# Patient Record
Sex: FEMALE | Race: WHITE | URBAN - METROPOLITAN AREA
[De-identification: names, ages, dates, MRNs, and addresses within clinical notes are randomized per-mention and may not be internally consistent; named-entity substitution may affect disease eponyms.]

---

## 2023-03-07 DIAGNOSIS — E63.9 NUTRITIONAL DEFICIENCY: ICD-10-CM

## 2023-03-07 DIAGNOSIS — F41.9 ANXIETY: Primary | ICD-10-CM

## 2023-03-07 DIAGNOSIS — N28.9 KIDNEY FUNCTION ABNORMAL: ICD-10-CM

## 2023-03-07 DIAGNOSIS — N93.8 DUB (DYSFUNCTIONAL UTERINE BLEEDING): ICD-10-CM

## 2023-03-07 DIAGNOSIS — L68.0 HIRSUTISM: ICD-10-CM

## 2023-03-07 DIAGNOSIS — R79.89 LOW VITAMIN D LEVEL: ICD-10-CM

## 2023-03-08 LAB
ALANINE AMINOTRANSFERASE (SGPT) (U/L) IN SER/PLAS: 60 U/L (ref 7–45)
ALBUMIN (G/DL) IN SER/PLAS: 4.5 G/DL (ref 3.4–5)
ALKALINE PHOSPHATASE (U/L) IN SER/PLAS: 57 U/L (ref 33–110)
ANION GAP IN SER/PLAS: 13 MMOL/L (ref 10–20)
ASPARTATE AMINOTRANSFERASE (SGOT) (U/L) IN SER/PLAS: 25 U/L (ref 9–39)
BASOPHILS (10*3/UL) IN BLOOD BY AUTOMATED COUNT: 0.04 X10E9/L (ref 0–0.1)
BASOPHILS/100 LEUKOCYTES IN BLOOD BY AUTOMATED COUNT: 0.7 % (ref 0–2)
BILIRUBIN TOTAL (MG/DL) IN SER/PLAS: 0.5 MG/DL (ref 0–1.2)
CALCIDIOL (25 OH VITAMIN D3) (NG/ML) IN SER/PLAS: 50 NG/ML
CALCIUM (MG/DL) IN SER/PLAS: 9.6 MG/DL (ref 8.6–10.6)
CARBON DIOXIDE, TOTAL (MMOL/L) IN SER/PLAS: 27 MMOL/L (ref 21–32)
CHLORIDE (MMOL/L) IN SER/PLAS: 104 MMOL/L (ref 98–107)
CHOLESTEROL (MG/DL) IN SER/PLAS: 151 MG/DL (ref 0–199)
CHOLESTEROL IN HDL (MG/DL) IN SER/PLAS: 39.3 MG/DL
CHOLESTEROL/HDL RATIO: 3.8
CREATININE (MG/DL) IN SER/PLAS: 0.97 MG/DL (ref 0.5–1.05)
EOSINOPHILS (10*3/UL) IN BLOOD BY AUTOMATED COUNT: 0.13 X10E9/L (ref 0–0.7)
EOSINOPHILS/100 LEUKOCYTES IN BLOOD BY AUTOMATED COUNT: 2.4 % (ref 0–6)
ERYTHROCYTE DISTRIBUTION WIDTH (RATIO) BY AUTOMATED COUNT: 12 % (ref 11.5–14.5)
ERYTHROCYTE MEAN CORPUSCULAR HEMOGLOBIN CONCENTRATION (G/DL) BY AUTOMATED: 33.3 G/DL (ref 32–36)
ERYTHROCYTE MEAN CORPUSCULAR VOLUME (FL) BY AUTOMATED COUNT: 97 FL (ref 80–100)
ERYTHROCYTES (10*6/UL) IN BLOOD BY AUTOMATED COUNT: 4.36 X10E12/L (ref 4–5.2)
FOLLITROPIN (IU/L) IN SER/PLAS: 8.4 IU/L
GFR FEMALE: 77 ML/MIN/1.73M2
GLUCOSE (MG/DL) IN SER/PLAS: 89 MG/DL (ref 74–99)
HEMATOCRIT (%) IN BLOOD BY AUTOMATED COUNT: 42.3 % (ref 36–46)
HEMOGLOBIN (G/DL) IN BLOOD: 14.1 G/DL (ref 12–16)
IMMATURE GRANULOCYTES/100 LEUKOCYTES IN BLOOD BY AUTOMATED COUNT: 0.2 % (ref 0–0.9)
LDL: 93 MG/DL (ref 0–99)
LEUKOCYTES (10*3/UL) IN BLOOD BY AUTOMATED COUNT: 5.5 X10E9/L (ref 4.4–11.3)
LUTEINIZING HORMONE (IU/ML) IN SER/PLAS: 4.1 IU/L
LYMPHOCYTES (10*3/UL) IN BLOOD BY AUTOMATED COUNT: 1.94 X10E9/L (ref 1.2–4.8)
LYMPHOCYTES/100 LEUKOCYTES IN BLOOD BY AUTOMATED COUNT: 35.3 % (ref 13–44)
MONOCYTES (10*3/UL) IN BLOOD BY AUTOMATED COUNT: 0.33 X10E9/L (ref 0.1–1)
MONOCYTES/100 LEUKOCYTES IN BLOOD BY AUTOMATED COUNT: 6 % (ref 2–10)
NEUTROPHILS (10*3/UL) IN BLOOD BY AUTOMATED COUNT: 3.04 X10E9/L (ref 1.2–7.7)
NEUTROPHILS/100 LEUKOCYTES IN BLOOD BY AUTOMATED COUNT: 55.4 % (ref 40–80)
NRBC (PER 100 WBCS) BY AUTOMATED COUNT: 0 /100 WBC (ref 0–0)
PLATELETS (10*3/UL) IN BLOOD AUTOMATED COUNT: 297 X10E9/L (ref 150–450)
POTASSIUM (MMOL/L) IN SER/PLAS: 4.4 MMOL/L (ref 3.5–5.3)
PROLACTIN (UG/L) IN SER/PLAS: 14.5 UG/L (ref 3–20)
PROTEIN TOTAL: 7.2 G/DL (ref 6.4–8.2)
SODIUM (MMOL/L) IN SER/PLAS: 140 MMOL/L (ref 136–145)
TESTOSTERONE (NG/DL) IN SER/PLAS: <60 NG/DL (ref 0–70)
THYROTROPIN (MIU/L) IN SER/PLAS BY DETECTION LIMIT <= 0.05 MIU/L: 2.76 MIU/L (ref 0.44–3.98)
TRIGLYCERIDE (MG/DL) IN SER/PLAS: 94 MG/DL (ref 0–149)
UREA NITROGEN (MG/DL) IN SER/PLAS: 25 MG/DL (ref 6–23)
VLDL: 19 MG/DL (ref 0–40)

## 2023-03-09 ENCOUNTER — TELEPHONE (OUTPATIENT)
Dept: PRIMARY CARE | Facility: CLINIC | Age: 38
End: 2023-03-09

## 2023-03-09 DIAGNOSIS — R74.8 ELEVATED LIVER ENZYMES: Primary | ICD-10-CM

## 2023-03-09 NOTE — TELEPHONE ENCOUNTER
----- Message from Kinza Rayo MD sent at 3/9/2023 12:04 PM EST -----  Lab results are normal normal female hormone

## 2023-03-09 NOTE — TELEPHONE ENCOUNTER
----- Message from Kinza Rayo MD sent at 3/9/2023 12:04 PM EST -----  Lab results are normal thyroid normal

## 2023-03-09 NOTE — TELEPHONE ENCOUNTER
Result Communication    Resulted Orders   FSH & LH   Result Value Ref Range    Follicle Stimulating Hormone 8.4 IU/L      Comment:      REF VALUES  FOLLICULAR  2-12  MID-CYCLE     12-25  LUTEAL PHASE   2-12  MENOPAUSE       PREPUBERTY    50% ADULT  ADULT MALE     2-10  INFANTS        0-1      LUTROPIN (IU/ML) IN SER/PLAS 4.1 IU/L      Comment:      REF VALUES  FOLLICULAR PHASE 1.9-12.5  MID-CYCLE        8.7-76.3  LUTEAL PHASE     0.5-16.9  POST MENOPAUSE   5.0-55.2  CHILDREN           0- 6.0  ADULT MALE 18-70 1.5- 9.3  ADULT MALE >70   3.1-34.6         12:41 PM      Results were successfully communicated with the patient and they acknowledged their understanding.

## 2023-03-09 NOTE — TELEPHONE ENCOUNTER
----- Message from Kinza Rayo MD sent at 3/9/2023 12:03 PM EST -----  Lab results are normal vitamin D level is normal

## 2023-03-09 NOTE — TELEPHONE ENCOUNTER
----- Message from Kinza Rayo MD sent at 3/9/2023 12:06 PM EST -----  Please notify pt of lab results 1 slightly elevated liver enzymes I do not think this is significant but I do want a repeat this blood work in about 2 weeks, order inputted

## 2023-03-09 NOTE — TELEPHONE ENCOUNTER
Result Communication    Resulted Orders   Testosterone   Result Value Ref Range    Testosterone <60 0 - 70 ng/dL      Comment:       Nandrolone decanoate, 11 Beta-hydroxytestosterone, androstenedione,   testosterone propionate and 11-keto-testosterone strongly cross    react with this test method.    Biotin interference may cause falsely elevated results. Patients   taking a Biotin dose of up to 5 mg/day should refrain from taking   Biotin for 24 hours before sample collection. Providers may contact   their local laboratory for further information.   Testing by a more sensitive method (Testosterone Total LC-MS/MS)   is recommended for accurate quantification of testosterone    levels less than 60 ng/dL.         12:43 PM      Results were successfully communicated with the patient and they acknowledged their understanding.

## 2023-03-09 NOTE — TELEPHONE ENCOUNTER
Result Communication    Resulted Orders   CBC and Auto Differential   Result Value Ref Range    WBC 5.5 4.4 - 11.3 x10E9/L    nRBC 0.0 0.0 - 0.0 /100 WBC    RBC 4.36 4.00 - 5.20 x10E12/L    Hemoglobin 14.1 12.0 - 16.0 g/dL    Hematocrit 42.3 36.0 - 46.0 %    MCV 97 80 - 100 fL    MCHC 33.3 32.0 - 36.0 g/dL    Platelets 297 150 - 450 x10E9/L    RDW 12.0 11.5 - 14.5 %    Neutrophils % 55.4 40.0 - 80.0 %    Immature Granulocytes %, Automated 0.2 0.0 - 0.9 %      Comment:       Immature Granulocyte Count (IG) includes promyelocytes,    myelocytes and metamyelocytes but does not include bands.   Percent differential counts (%) should be interpreted in the   context of the absolute cell counts (cells/L).      Lymphocytes % 35.3 13.0 - 44.0 %    Monocytes % 6.0 2.0 - 10.0 %    Eosinophils % 2.4 0.0 - 6.0 %    Basophils % 0.7 0.0 - 2.0 %    Neutrophils Absolute 3.04 1.20 - 7.70 x10E9/L    Lymphocytes Absolute 1.94 1.20 - 4.80 x10E9/L    Monocytes Absolute 0.33 0.10 - 1.00 x10E9/L    Eosinophils Absolute 0.13 0.00 - 0.70 x10E9/L    Basophils Absolute 0.04 0.00 - 0.10 x10E9/L       12:36 PM      Results were successfully communicated with the patient and they acknowledged their understanding.  Result Communication    Resulted Orders   CBC and Auto Differential   Result Value Ref Range    WBC 5.5 4.4 - 11.3 x10E9/L    nRBC 0.0 0.0 - 0.0 /100 WBC    RBC 4.36 4.00 - 5.20 x10E12/L    Hemoglobin 14.1 12.0 - 16.0 g/dL    Hematocrit 42.3 36.0 - 46.0 %    MCV 97 80 - 100 fL    MCHC 33.3 32.0 - 36.0 g/dL    Platelets 297 150 - 450 x10E9/L    RDW 12.0 11.5 - 14.5 %    Neutrophils % 55.4 40.0 - 80.0 %    Immature Granulocytes %, Automated 0.2 0.0 - 0.9 %      Comment:       Immature Granulocyte Count (IG) includes promyelocytes,    myelocytes and metamyelocytes but does not include bands.   Percent differential counts (%) should be interpreted in the   context of the absolute cell counts (cells/L).      Lymphocytes % 35.3 13.0 - 44.0 %     Monocytes % 6.0 2.0 - 10.0 %    Eosinophils % 2.4 0.0 - 6.0 %    Basophils % 0.7 0.0 - 2.0 %    Neutrophils Absolute 3.04 1.20 - 7.70 x10E9/L    Lymphocytes Absolute 1.94 1.20 - 4.80 x10E9/L    Monocytes Absolute 0.33 0.10 - 1.00 x10E9/L    Eosinophils Absolute 0.13 0.00 - 0.70 x10E9/L    Basophils Absolute 0.04 0.00 - 0.10 x10E9/L       12:36 PM      Results were successfully communicated with the patient and they acknowledged their understanding.

## 2023-03-09 NOTE — RESULT ENCOUNTER NOTE
Please notify pt of lab results 1 slightly elevated liver enzymes I do not think this is significant but I do want a repeat this blood work in about 2 weeks, order inputted

## 2023-03-09 NOTE — TELEPHONE ENCOUNTER
Result Communication    Resulted Orders   Prolactin   Result Value Ref Range    Prolactin 14.5 3.0 - 20.0 ug/L       12:44 PM      Results were successfully communicated with the patient and they acknowledged their understanding.

## 2023-03-09 NOTE — TELEPHONE ENCOUNTER
Result Communication    Resulted Orders   Thyroid Stimulating Hormone   Result Value Ref Range    TSH 2.76 0.44 - 3.98 mIU/L      Comment:       TSH testing is performed using different testing    methodology at [facility] than at other    [facility] hospitals. Direct result comparisons should    only be made within the same method.         12:44 PM      Results were successfully communicated with the patient and they acknowledged their understanding.

## 2023-03-09 NOTE — TELEPHONE ENCOUNTER
Result Communication    Resulted Orders   Vitamin D, Total   Result Value Ref Range    Vitamin D, 25-Hydroxy 50 ng/mL      Comment:      .  DEFICIENCY:         < 20   NG/ML  INSUFFICIENCY:      20-29  NG/ML  SUFFICIENCY:         NG/ML    THIS ASSAY ACCURATELY QUANTIFIES THE SUM OF  VITAMIN D3, 25-HYDROXY AND VIT D2,25-HYDROXY.         12:42 PM      Results were successfully communicated with the patient and they acknowledged their understanding.

## 2023-03-09 NOTE — TELEPHONE ENCOUNTER
Result Communication    Resulted Orders   Comprehensive Metabolic Panel   Result Value Ref Range    Glucose 89 74 - 99 mg/dL    Sodium 140 136 - 145 mmol/L    Potassium 4.4 3.5 - 5.3 mmol/L    Chloride 104 98 - 107 mmol/L    Bicarbonate 27 21 - 32 mmol/L    Anion Gap 13 10 - 20 mmol/L    Urea Nitrogen 25 (H) 6 - 23 mg/dL    Creatinine 0.97 0.50 - 1.05 mg/dL    GFR Female 77 >90 mL/min/1.73m2      Comment:       CALCULATIONS OF ESTIMATED GFR ARE PERFORMED   USING THE 2021 CKD-EPI STUDY REFIT EQUATION   WITHOUT THE RACE VARIABLE FOR THE IDMS-TRACEABLE   CREATININE METHODS.    https://jasn.asnjournals.org/content/early/2021/09/22/ASN.8688474193      Calcium 9.6 8.6 - 10.6 mg/dL    Albumin 4.5 3.4 - 5.0 g/dL    Alkaline Phosphatase 57 33 - 110 U/L    Total Protein 7.2 6.4 - 8.2 g/dL    AST 25 9 - 39 U/L    Total Bilirubin 0.5 0.0 - 1.2 mg/dL    ALT (SGPT) 60 (H) 7 - 45 U/L      Comment:       Patients treated with Sulfasalazine may generate    falsely decreased results for ALT.         12:45 PM      Results were successfully communicated with the patient and they acknowledged their understanding.

## 2023-03-09 NOTE — TELEPHONE ENCOUNTER
----- Message from Kinza Rayo MD sent at 3/9/2023 12:02 PM EST -----  Lab results are normal blood count is normal

## 2023-03-09 NOTE — TELEPHONE ENCOUNTER
----- Message from Kinza Rayo MD sent at 3/9/2023 12:03 PM EST -----  Lab results are normal ovulatory hormones normal

## 2023-03-09 NOTE — TELEPHONE ENCOUNTER
----- Message from Kinza Rayo MD sent at 3/9/2023 12:04 PM EST -----  Lab results are normal, testosterone level normal

## 2024-03-05 DIAGNOSIS — Z78.9 USES DEPO-PROVERA AS PRIMARY BIRTH CONTROL METHOD: Primary | ICD-10-CM

## 2024-03-05 RX ORDER — MEDROXYPROGESTERONE ACETATE 150 MG/ML
INJECTION, SUSPENSION INTRAMUSCULAR
COMMUNITY
End: 2024-03-05 | Stop reason: SDUPTHER

## 2024-03-05 RX ORDER — MEDROXYPROGESTERONE ACETATE 150 MG/ML
INJECTION, SUSPENSION INTRAMUSCULAR
Qty: 1 ML | Refills: 1 | Status: SHIPPED | OUTPATIENT
Start: 2024-03-05 | End: 2024-06-05 | Stop reason: ALTCHOICE

## 2024-03-05 NOTE — TELEPHONE ENCOUNTER
Please send refill of Depo. Pt will cb end of this month to schedule APE for June. Pt last depo was December.

## 2024-06-05 ENCOUNTER — TELEMEDICINE (OUTPATIENT)
Dept: OBSTETRICS AND GYNECOLOGY | Facility: CLINIC | Age: 39
End: 2024-06-05
Payer: COMMERCIAL

## 2024-06-05 DIAGNOSIS — N91.1 AMENORRHEA, SECONDARY: ICD-10-CM

## 2024-06-05 DIAGNOSIS — N85.7 HEMATOMETRA: Chronic | ICD-10-CM

## 2024-06-05 DIAGNOSIS — N88.2 CERVICAL OS STENOSIS: ICD-10-CM

## 2024-06-05 DIAGNOSIS — L68.0 HIRSUTISM: Primary | ICD-10-CM

## 2024-06-05 DIAGNOSIS — Z98.890 HISTORY OF LOOP ELECTRICAL EXCISION PROCEDURE (LEEP): ICD-10-CM

## 2024-06-05 DIAGNOSIS — Z78.9 USES BIRTH CONTROL: ICD-10-CM

## 2024-06-05 DIAGNOSIS — R63.5 ABNORMAL WEIGHT GAIN: ICD-10-CM

## 2024-06-05 DIAGNOSIS — Z01.411 ENCOUNTER FOR GYNECOLOGICAL EXAMINATION WITH ABNORMAL FINDING: ICD-10-CM

## 2024-06-05 PROCEDURE — 99213 OFFICE O/P EST LOW 20 MIN: CPT | Performed by: OBSTETRICS & GYNECOLOGY

## 2024-06-05 RX ORDER — DROSPIRENONE 4 MG/1
4 TABLET, FILM COATED ORAL DAILY
Qty: 84 TABLET | Refills: 3 | Status: SHIPPED | OUTPATIENT
Start: 2024-06-05

## 2024-06-05 RX ORDER — DROSPIRENONE 4 MG/1
4 TABLET, FILM COATED ORAL DAILY
Qty: 84 TABLET | Refills: 3 | Status: SHIPPED | OUTPATIENT
Start: 2024-06-05 | End: 2024-06-05

## 2024-06-05 NOTE — PROGRESS NOTES
Consent:  Verbal consent was requested and obtained from patient on this date for a telehealth visit to determine if a office visit would be necessary for the patient's complaint(s).    Referring Physician: No referring provider defined for this encounter.  Primary Care Provider: Kinza Rayo MD     Subjective    HPI:  40 yo CF with the following complaints on Depo Provera.  Depression; weight gain; mood swings; acne; thinning hair of head; but, desired amenorrhea. She is afraid to wash her hair due to locs of hair that comes out.  She is inquiring about a hysterectomy.   History of ortho tricyclen and continuous loestrin - developed recurring hematometras. Requiring D&Cs.     Past Medical History:   Diagnosis Date    Abnormal Pap smear of cervix     Cervical dysplasia     Depression     Herpes     HPV (human papilloma virus) infection     Personal history of diseases of the blood and blood-forming organs and certain disorders involving the immune mechanism     History of anemia        Past Surgical History:   Procedure Laterality Date    CERVICAL BIOPSY  W/ LOOP ELECTRODE EXCISION       SECTION, LOW TRANSVERSE  2017    COLPOSCOPY      DILATION AND CURETTAGE OF UTERUS      Twice for hematometras        Social History     Tobacco Use    Smoking status: Never    Smokeless tobacco: Never   Substance Use Topics    Alcohol use: Not Currently    Drug use: Never        Current Outpatient Medications   Medication Instructions    Slynd 4 mg (28) tablet 1 tablet, oral, Daily      Objective    BSA: There is no height or weight on file to calculate BSA.  There were no vitals taken for this visit.     Physical Exam  General: AAOx3 in NAD   Psych: mood and affect are appropiate. Able to consent.     Assessment/Plan      Diagnoses and all orders for this visit:  Hirsutism  -     Slynd 4 mg (28) tablet; Take 1 tablet by mouth once daily.  Abnormal weight gain  -     Slynd 4 mg (28) tablet; Take 1 tablet  by mouth once daily.  Uses birth control  -     Slynd 4 mg (28) tablet; Take 1 tablet by mouth once daily.  Cervical os stenosis  -     US PELVIS TRANSABDOMINAL WITH TRANSVAGINAL; Future  History of loop electrical excision procedure (LEEP)  Hematometra  Comments:  History of recurring problem.  Orders:  -     US PELVIS TRANSABDOMINAL WITH TRANSVAGINAL; Future  Amenorrhea, secondary  -     US PELVIS TRANSABDOMINAL WITH TRANSVAGINAL; Future  Encounter for gynecological examination with abnormal finding  -     Follow Up In Gynecology; Future       Treatment Plans    Start Slynd this weekend. Call office right away if problem with pharmacy.   Screen for recurrence of hematometra after 3-4 months of Slynd via pelvic US  Follow-up for annual exam and pap after scan.  If Slynd fails, then hysterectomy.  Discontinue Depo Provera now (was due for shot this week).

## 2024-09-23 ENCOUNTER — HOSPITAL ENCOUNTER (OUTPATIENT)
Dept: RADIOLOGY | Facility: CLINIC | Age: 39
Discharge: HOME | End: 2024-09-23
Payer: COMMERCIAL

## 2024-09-23 ENCOUNTER — APPOINTMENT (OUTPATIENT)
Dept: RADIOLOGY | Facility: CLINIC | Age: 39
End: 2024-09-23
Payer: COMMERCIAL

## 2024-09-23 DIAGNOSIS — N91.1 AMENORRHEA, SECONDARY: ICD-10-CM

## 2024-09-23 DIAGNOSIS — N85.7 HEMATOMETRA: Chronic | ICD-10-CM

## 2024-09-23 DIAGNOSIS — N88.2 CERVICAL OS STENOSIS: ICD-10-CM

## 2024-09-23 PROCEDURE — 76830 TRANSVAGINAL US NON-OB: CPT | Performed by: RADIOLOGY

## 2024-09-23 PROCEDURE — 76856 US EXAM PELVIC COMPLETE: CPT

## 2024-09-23 PROCEDURE — 76856 US EXAM PELVIC COMPLETE: CPT | Performed by: RADIOLOGY

## 2025-02-14 ENCOUNTER — TELEPHONE (OUTPATIENT)
Dept: OBSTETRICS AND GYNECOLOGY | Facility: CLINIC | Age: 40
End: 2025-02-14
Payer: COMMERCIAL

## 2025-02-14 NOTE — TELEPHONE ENCOUNTER
Allow the 7 day break for the breakthrough menstruation.  Then resume her continuous therapy. She may need to start taking a break about every 4 to 6  packs.

## 2025-05-28 ENCOUNTER — TELEPHONE (OUTPATIENT)
Dept: OBSTETRICS AND GYNECOLOGY | Facility: CLINIC | Age: 40
End: 2025-05-28
Payer: COMMERCIAL

## 2025-05-28 NOTE — TELEPHONE ENCOUNTER
Bed: QT 18  Expected date:   Expected time:   Means of arrival:   Comments:   PA Approved for Slynd    CaseId:[id number];Status:Approved;Review Type:Prior Auth;Coverage Start Date:04/28/2025;Coverage End Date:05/28/2026;

## 2025-08-21 DIAGNOSIS — R63.5 ABNORMAL WEIGHT GAIN: ICD-10-CM

## 2025-08-21 DIAGNOSIS — Z78.9 USES BIRTH CONTROL: ICD-10-CM

## 2025-08-21 DIAGNOSIS — L68.0 HIRSUTISM: ICD-10-CM

## 2025-08-23 RX ORDER — DROSPIRENONE 4 MG/1
4 TABLET, FILM COATED ORAL DAILY
Qty: 84 TABLET | Refills: 3 | Status: SHIPPED | OUTPATIENT
Start: 2025-08-23